# Patient Record
Sex: MALE | Race: WHITE | NOT HISPANIC OR LATINO | Employment: FULL TIME | URBAN - METROPOLITAN AREA
[De-identification: names, ages, dates, MRNs, and addresses within clinical notes are randomized per-mention and may not be internally consistent; named-entity substitution may affect disease eponyms.]

---

## 2023-11-13 ENCOUNTER — APPOINTMENT (OUTPATIENT)
Dept: PRIMARY CARE | Facility: CLINIC | Age: 23
End: 2023-11-13
Payer: COMMERCIAL

## 2024-01-03 PROBLEM — G47.00 PERSISTENT INSOMNIA: Status: ACTIVE | Noted: 2024-01-03

## 2024-01-03 PROBLEM — L50.8 ACUTE URTICARIA: Status: ACTIVE | Noted: 2024-01-03

## 2024-01-03 PROBLEM — L30.9 ECZEMA: Status: ACTIVE | Noted: 2024-01-03

## 2024-01-03 RX ORDER — CLOBETASOL PROPIONATE 0.5 MG/G
CREAM TOPICAL 2 TIMES DAILY
COMMUNITY
Start: 2017-11-22 | End: 2024-03-08 | Stop reason: WASHOUT

## 2024-01-03 RX ORDER — TRAZODONE HYDROCHLORIDE 50 MG/1
TABLET ORAL
COMMUNITY
Start: 2022-05-09 | End: 2024-03-08 | Stop reason: WASHOUT

## 2024-01-07 DIAGNOSIS — Z00.00 HEALTHCARE MAINTENANCE: Primary | ICD-10-CM

## 2024-01-08 ENCOUNTER — APPOINTMENT (OUTPATIENT)
Dept: PRIMARY CARE | Facility: CLINIC | Age: 24
End: 2024-01-08
Payer: COMMERCIAL

## 2024-01-10 ENCOUNTER — TELEPHONE (OUTPATIENT)
Dept: PRIMARY CARE | Facility: CLINIC | Age: 24
End: 2024-01-10
Payer: COMMERCIAL

## 2024-03-08 ENCOUNTER — LAB (OUTPATIENT)
Dept: LAB | Facility: LAB | Age: 24
End: 2024-03-08
Payer: COMMERCIAL

## 2024-03-08 ENCOUNTER — OFFICE VISIT (OUTPATIENT)
Dept: PRIMARY CARE | Facility: CLINIC | Age: 24
End: 2024-03-08
Payer: COMMERCIAL

## 2024-03-08 VITALS
HEIGHT: 70 IN | WEIGHT: 196 LBS | RESPIRATION RATE: 12 BRPM | SYSTOLIC BLOOD PRESSURE: 118 MMHG | BODY MASS INDEX: 28.06 KG/M2 | HEART RATE: 68 BPM | DIASTOLIC BLOOD PRESSURE: 72 MMHG | OXYGEN SATURATION: 98 %

## 2024-03-08 DIAGNOSIS — Z00.00 ROUTINE GENERAL MEDICAL EXAMINATION AT A HEALTH CARE FACILITY: Primary | ICD-10-CM

## 2024-03-08 DIAGNOSIS — Z00.00 HEALTHCARE MAINTENANCE: ICD-10-CM

## 2024-03-08 DIAGNOSIS — G47.00 PERSISTENT INSOMNIA: ICD-10-CM

## 2024-03-08 PROBLEM — L50.8 ACUTE URTICARIA: Status: RESOLVED | Noted: 2024-01-03 | Resolved: 2024-03-08

## 2024-03-08 LAB
ALBUMIN SERPL BCP-MCNC: 4.8 G/DL (ref 3.4–5)
ALP SERPL-CCNC: 61 U/L (ref 33–120)
ALT SERPL W P-5'-P-CCNC: 35 U/L (ref 10–52)
ANION GAP SERPL CALC-SCNC: 11 MMOL/L (ref 10–20)
APPEARANCE UR: CLEAR
AST SERPL W P-5'-P-CCNC: 28 U/L (ref 9–39)
BILIRUB SERPL-MCNC: 0.6 MG/DL (ref 0–1.2)
BILIRUB UR STRIP.AUTO-MCNC: NEGATIVE MG/DL
BUN SERPL-MCNC: 27 MG/DL (ref 6–23)
CALCIUM SERPL-MCNC: 9.8 MG/DL (ref 8.6–10.6)
CHLORIDE SERPL-SCNC: 103 MMOL/L (ref 98–107)
CHOLEST SERPL-MCNC: 180 MG/DL (ref 0–199)
CHOLESTEROL/HDL RATIO: 4.3
CO2 SERPL-SCNC: 29 MMOL/L (ref 21–32)
COLOR UR: NORMAL
CREAT SERPL-MCNC: 1.12 MG/DL (ref 0.5–1.3)
EGFRCR SERPLBLD CKD-EPI 2021: >90 ML/MIN/1.73M*2
ERYTHROCYTE [DISTWIDTH] IN BLOOD BY AUTOMATED COUNT: 11.6 % (ref 11.5–14.5)
GLUCOSE SERPL-MCNC: 105 MG/DL (ref 74–99)
GLUCOSE UR STRIP.AUTO-MCNC: NORMAL MG/DL
HCT VFR BLD AUTO: 44.7 % (ref 41–52)
HCV AB SER QL: NONREACTIVE
HDLC SERPL-MCNC: 42 MG/DL
HGB BLD-MCNC: 14.7 G/DL (ref 13.5–17.5)
HOLD SPECIMEN: NORMAL
KETONES UR STRIP.AUTO-MCNC: NEGATIVE MG/DL
LDLC SERPL CALC-MCNC: 118 MG/DL
LEUKOCYTE ESTERASE UR QL STRIP.AUTO: NEGATIVE
MCH RBC QN AUTO: 29.9 PG (ref 26–34)
MCHC RBC AUTO-ENTMCNC: 32.9 G/DL (ref 32–36)
MCV RBC AUTO: 91 FL (ref 80–100)
NITRITE UR QL STRIP.AUTO: NEGATIVE
NON HDL CHOLESTEROL: 138 MG/DL (ref 0–149)
NRBC BLD-RTO: 0 /100 WBCS (ref 0–0)
PH UR STRIP.AUTO: 6 [PH]
PLATELET # BLD AUTO: 218 X10*3/UL (ref 150–450)
POTASSIUM SERPL-SCNC: 4.3 MMOL/L (ref 3.5–5.3)
PROT SERPL-MCNC: 7 G/DL (ref 6.4–8.2)
PROT UR STRIP.AUTO-MCNC: NEGATIVE MG/DL
RBC # BLD AUTO: 4.92 X10*6/UL (ref 4.5–5.9)
RBC # UR STRIP.AUTO: NEGATIVE /UL
SODIUM SERPL-SCNC: 139 MMOL/L (ref 136–145)
SP GR UR STRIP.AUTO: 1.03
TRIGL SERPL-MCNC: 101 MG/DL (ref 0–149)
TSH SERPL-ACNC: 3.02 MIU/L (ref 0.44–3.98)
UROBILINOGEN UR STRIP.AUTO-MCNC: NORMAL MG/DL
VLDL: 20 MG/DL (ref 0–40)
WBC # BLD AUTO: 6 X10*3/UL (ref 4.4–11.3)

## 2024-03-08 PROCEDURE — 85027 COMPLETE CBC AUTOMATED: CPT

## 2024-03-08 PROCEDURE — 81003 URINALYSIS AUTO W/O SCOPE: CPT

## 2024-03-08 PROCEDURE — 1036F TOBACCO NON-USER: CPT | Performed by: FAMILY MEDICINE

## 2024-03-08 PROCEDURE — 36415 COLL VENOUS BLD VENIPUNCTURE: CPT

## 2024-03-08 PROCEDURE — 80061 LIPID PANEL: CPT

## 2024-03-08 PROCEDURE — 86803 HEPATITIS C AB TEST: CPT

## 2024-03-08 PROCEDURE — 80053 COMPREHEN METABOLIC PANEL: CPT

## 2024-03-08 PROCEDURE — 84443 ASSAY THYROID STIM HORMONE: CPT

## 2024-03-08 PROCEDURE — 99395 PREV VISIT EST AGE 18-39: CPT | Performed by: FAMILY MEDICINE

## 2024-03-08 RX ORDER — CREATINE MONOHYDRATE 100 %
POWDER (GRAM) MISCELLANEOUS 2 TIMES DAILY
COMMUNITY

## 2024-03-08 RX ORDER — MIRTAZAPINE 7.5 MG/1
7.5 TABLET, FILM COATED ORAL NIGHTLY
Qty: 30 TABLET | Refills: 2 | Status: SHIPPED | OUTPATIENT
Start: 2024-03-08 | End: 2024-06-06

## 2024-03-08 ASSESSMENT — ENCOUNTER SYMPTOMS
NERVOUS/ANXIOUS: 0
CHEST TIGHTNESS: 0
BLOOD IN STOOL: 0
ABDOMINAL PAIN: 0
SORE THROAT: 0
FEVER: 0
ADENOPATHY: 0
CONSTIPATION: 0
DIZZINESS: 0
SHORTNESS OF BREATH: 0
WEAKNESS: 0
DIARRHEA: 0
CHILLS: 0
DIFFICULTY URINATING: 0
APPETITE CHANGE: 0
DYSURIA: 0
DYSPHORIC MOOD: 0
BRUISES/BLEEDS EASILY: 0
HEADACHES: 0
EYE PAIN: 0
ARTHRALGIAS: 0
FATIGUE: 1
ABDOMINAL DISTENTION: 0
SLEEP DISTURBANCE: 1
EYE REDNESS: 0
BACK PAIN: 0
COUGH: 0

## 2024-03-08 NOTE — PROGRESS NOTES
"Subjective   Patient ID: Derek Holt is a 23 y.o. male who presents for Annual Exam.  PMHX, PSHx, Fam hx, and Social hx reviewed.   New concerns none  Vaccines current  Dentist seen at least yearly yes   Vision concerns none  Hearing concerns none  Diet is overall healthy.   Smoker - no  Alcohol use - 8 drinks per week  Exercising 5-6 days per week.   Sexually active - yes, no issues   Colonoscopy NA         Review of Systems   Constitutional:  Positive for fatigue. Negative for appetite change, chills and fever.   HENT:  Negative for congestion, hearing loss and sore throat.    Eyes:  Negative for pain, redness and visual disturbance.   Respiratory:  Negative for cough, chest tightness and shortness of breath.    Cardiovascular:  Negative for chest pain and leg swelling.   Gastrointestinal:  Negative for abdominal distention, abdominal pain, blood in stool, constipation and diarrhea.   Genitourinary:  Negative for difficulty urinating and dysuria.   Musculoskeletal:  Negative for arthralgias and back pain.   Skin:  Negative for rash.   Neurological:  Negative for dizziness, weakness and headaches.   Hematological:  Negative for adenopathy. Does not bruise/bleed easily.   Psychiatric/Behavioral:  Positive for sleep disturbance (Trazodone hasn't helped with insomnia, getting about 4-5hours sleep per night.). Negative for dysphoric mood. The patient is not nervous/anxious.        Objective   /72   Pulse 68   Resp 12   Ht 1.778 m (5' 10\")   Wt 88.9 kg (196 lb)   SpO2 98%   BMI 28.12 kg/m²    Physical Exam  Constitutional:       General: He is not in acute distress.     Appearance: Normal appearance. He is not ill-appearing.   HENT:      Head: Normocephalic and atraumatic.      Right Ear: Tympanic membrane, ear canal and external ear normal.      Left Ear: Tympanic membrane, ear canal and external ear normal.      Nose: Nose normal.      Mouth/Throat:      Mouth: Mucous membranes are moist.      Pharynx: " No oropharyngeal exudate or posterior oropharyngeal erythema.   Eyes:      Extraocular Movements: Extraocular movements intact.      Conjunctiva/sclera: Conjunctivae normal.      Pupils: Pupils are equal, round, and reactive to light.   Neck:      Vascular: No carotid bruit.   Cardiovascular:      Rate and Rhythm: Normal rate and regular rhythm.      Heart sounds: Normal heart sounds. No murmur heard.  Pulmonary:      Breath sounds: Normal breath sounds. No wheezing, rhonchi or rales.   Abdominal:      General: Bowel sounds are normal. There is no distension.      Palpations: Abdomen is soft. There is no mass.      Tenderness: There is no abdominal tenderness.   Musculoskeletal:         General: No swelling or deformity.      Cervical back: Neck supple. No tenderness.   Lymphadenopathy:      Cervical: No cervical adenopathy.   Skin:     General: Skin is warm and dry.      Findings: No lesion or rash.   Neurological:      Mental Status: He is alert and oriented to person, place, and time.      Sensory: No sensory deficit.      Motor: No weakness.      Coordination: Coordination normal.      Deep Tendon Reflexes: Reflexes normal.   Psychiatric:         Mood and Affect: Mood normal.         Behavior: Behavior normal.         Judgment: Judgment normal.           Assessment/Plan   Diagnoses and all orders for this visit:  Routine general medical examination  - vaccines current. Checking fasting labs today.  Persistent insomnia - Trazodone ineffective, will start Mirtazepine before bedtime, call in 1 month if needing dose adjustment.    Follow up in 6 months, 15mins

## 2024-03-08 NOTE — PROGRESS NOTES
"Subjective   Patient ID: Derek Holt is a 23 y.o. male who presents for Annual Exam.    HPI     Review of Systems    Objective   /72   Pulse 68   Resp 12   Ht 1.778 m (5' 10\")   Wt 88.9 kg (196 lb)   SpO2 98%   BMI 28.12 kg/m²     Physical Exam    Assessment/Plan          "

## 2024-03-12 ENCOUNTER — TELEPHONE (OUTPATIENT)
Dept: PRIMARY CARE | Facility: CLINIC | Age: 24
End: 2024-03-12
Payer: COMMERCIAL

## 2024-03-12 NOTE — TELEPHONE ENCOUNTER
----- Message from José Miguel Fischer MD sent at 3/8/2024 11:06 PM EST -----  Blood sugar is elevated in the prediabetes range. Recommend low carb diet, healthy snacking, and regular cardiovascular exercise. BUN elevated, should increase water intake, goal 64oz daily. Other labs look good.   ----- Message -----  From: Lab, Background User  Sent: 3/8/2024   5:05 PM EST  To: José Miguel Fischer MD

## 2024-09-09 ENCOUNTER — APPOINTMENT (OUTPATIENT)
Dept: PRIMARY CARE | Facility: CLINIC | Age: 24
End: 2024-09-09
Payer: COMMERCIAL